# Patient Record
Sex: FEMALE | URBAN - METROPOLITAN AREA
[De-identification: names, ages, dates, MRNs, and addresses within clinical notes are randomized per-mention and may not be internally consistent; named-entity substitution may affect disease eponyms.]

---

## 2020-06-04 ENCOUNTER — COMPLETE SKIN EXAM (OUTPATIENT)
Dept: URBAN - METROPOLITAN AREA CLINIC 19 | Facility: CLINIC | Age: 60
Setting detail: DERMATOLOGY
End: 2020-06-04

## 2020-06-04 DIAGNOSIS — L57.0 ACTINIC KERATOSIS: ICD-10-CM

## 2020-06-04 PROBLEM — D18.01 HEMANGIOMA OF SKIN AND SUBCUTANEOUS TISSUE: Status: RESOLVED | Noted: 2020-06-04

## 2020-06-04 PROBLEM — L60.3 NAIL DYSTROPHY: Status: RESOLVED | Noted: 2020-06-04

## 2020-06-04 PROBLEM — L82.1 OTHER SEBORRHEIC KERATOSIS: Status: RESOLVED | Noted: 2020-06-04

## 2020-06-04 PROCEDURE — 11102 TANGNTL BX SKIN SINGLE LES: CPT

## 2020-06-04 PROCEDURE — 99203 OFFICE O/P NEW LOW 30 MIN: CPT

## 2020-07-02 ENCOUNTER — OTHER (OUTPATIENT)
Dept: URBAN - METROPOLITAN AREA CLINIC 19 | Facility: CLINIC | Age: 60
Setting detail: DERMATOLOGY
End: 2020-07-02

## 2020-07-02 PROCEDURE — 11755 BIOPSY NAIL UNIT: CPT

## 2020-08-18 RX ORDER — BETAMETHASONE DIPROPIONATE 0.5 MG/G
AS DIRECTED CREAM TOPICAL TWICE A DAY
Qty: 60 | Refills: 3
Start: 2020-08-18

## 2024-02-20 ENCOUNTER — OFFICE (OUTPATIENT)
Dept: URBAN - METROPOLITAN AREA CLINIC 67 | Facility: CLINIC | Age: 64
End: 2024-02-20

## 2024-02-20 VITALS
HEIGHT: 60 IN | SYSTOLIC BLOOD PRESSURE: 118 MMHG | HEART RATE: 108 BPM | WEIGHT: 180 LBS | DIASTOLIC BLOOD PRESSURE: 78 MMHG

## 2024-02-20 DIAGNOSIS — R09.A2 FOREIGN BODY SENSATION, THROAT: ICD-10-CM

## 2024-02-20 DIAGNOSIS — K21.9 GASTRO-ESOPHAGEAL REFLUX DISEASE WITHOUT ESOPHAGITIS: ICD-10-CM

## 2024-02-20 DIAGNOSIS — R07.9 CHEST PAIN, UNSPECIFIED: ICD-10-CM

## 2024-02-20 PROCEDURE — 99204 OFFICE O/P NEW MOD 45 MIN: CPT | Performed by: INTERNAL MEDICINE

## 2024-02-20 NOTE — SERVICEHPINOTES
The patient is seen today for follow-up after a recent ER visit. She was seen in the [facility] ER on 2/6/24 secondary to a one day history of left sided chest pain.brSerial troponin determinations were negative and her EKG was without any ischemic changes. brHer CBC, lipase, d-dimer and liver enzymes were normal - a PA/Lat chest x-ray was without any acute abnormality.Her chest pain improved with Carafate and she was discharged home along with a prescription for Carafate. brToday, she reports that her chest pain has resolved with taking the Carafate which she just finished yesterday. She continuees to have frequent heartburn symptoms (especially at night) for which she takes OTC antacids. She has a history of chronic GERD/heartburn and has been on Pantoprazole previously. 
br She endorses associated globus sensation but no dysphagia per se and no emesis, GI tract bleeding symptoms or unexplained weight loss. She has not had an endoscopic evaluation of her upper GI tract previously.br

## 2024-02-20 NOTE — SERVICENOTES
We will plan for an EGD given her recent symptoms and chronic GERD/heartburn - for now, I will have her remain on OTC antacids as needed and we discussed the possibility of escalating her treatment back up to a daily PPI depending on the findings.
She would like to do a Cologuard for CRC screening and we will wait for those results first before scheduling her EGD (in the event a colonoscopy is also needed).

## 2024-04-01 ENCOUNTER — OFFICE (OUTPATIENT)
Dept: URBAN - METROPOLITAN AREA PATHOLOGY 10 | Facility: PATHOLOGY | Age: 64
End: 2024-04-01

## 2024-04-01 ENCOUNTER — AMBULATORY SURGICAL CENTER (OUTPATIENT)
Dept: URBAN - METROPOLITAN AREA SURGERY 19 | Facility: SURGERY | Age: 64
End: 2024-04-01

## 2024-04-01 DIAGNOSIS — R12 HEARTBURN: ICD-10-CM

## 2024-04-01 DIAGNOSIS — K31.89 OTHER DISEASES OF STOMACH AND DUODENUM: ICD-10-CM

## 2024-04-01 DIAGNOSIS — K21.00 GASTRO-ESOPHAGEAL REFLUX DISEASE WITH ESOPHAGITIS, WITHOUT B: ICD-10-CM

## 2024-04-01 LAB
RELEVANT H&P ENDOSCOPY: (no result)
RELEVANT H&P ENDOSCOPY: (no result)

## 2024-04-01 PROCEDURE — 43239 EGD BIOPSY SINGLE/MULTIPLE: CPT | Performed by: INTERNAL MEDICINE

## 2024-04-01 PROCEDURE — 88305 TISSUE EXAM BY PATHOLOGIST: CPT | Mod: TC | Performed by: STUDENT IN AN ORGANIZED HEALTH CARE EDUCATION/TRAINING PROGRAM
